# Patient Record
(demographics unavailable — no encounter records)

---

## 2024-10-08 NOTE — ASSESSMENT
[FreeTextEntry1] : 61 yo female with history of chronic diarrhea with normal colonoscopy.  Empiric trial of cholestyramine one packet daily.  Patient advised not to take medication with other medications.  Follow up in one month.

## 2024-10-08 NOTE — HISTORY OF PRESENT ILLNESS
[FreeTextEntry1] : Ms. BAILEY LESTER is a 62 year old female with history of diarrhea.  Patient had colonoscopy and biopsy that did not show evidence of microscopic colitis.  She does continue to note daily diarrhea.  There is no associated cramping, bleeding or nocturnal symptoms.  Possible etiologies discussed at length with patient.

## 2024-10-08 NOTE — PHYSICAL EXAM
[Alert] : alert [Normal Voice/Communication] : normal voice/communication [Healthy Appearing] : healthy appearing [No Acute Distress] : no acute distress [Sclera] : the sclera and conjunctiva were normal [Hearing Threshold Finger Rub Not [place]] : hearing was normal [Normal Lips/Gums] : the lips and gums were normal [Normal Appearance] : the appearance of the neck was normal [Oropharynx] : the oropharynx was normal [No Neck Mass] : no neck mass was observed [No Respiratory Distress] : no respiratory distress [No Acc Muscle Use] : no accessory muscle use [Respiration, Rhythm And Depth] : normal respiratory rhythm and effort [Auscultation Breath Sounds / Voice Sounds] : lungs were clear to auscultation bilaterally [Heart Rate And Rhythm] : heart rate was normal and rhythm regular [Normal S1, S2] : normal S1 and S2 [Murmurs] : no murmurs [Bowel Sounds] : normal bowel sounds [Abdomen Tenderness] : non-tender [No Masses] : no abdominal mass palpated [Abdomen Soft] : soft [] : no hepatosplenomegaly [Oriented To Time, Place, And Person] : oriented to person, place, and time